# Patient Record
Sex: FEMALE | Race: WHITE | NOT HISPANIC OR LATINO | Employment: FULL TIME | URBAN - METROPOLITAN AREA
[De-identification: names, ages, dates, MRNs, and addresses within clinical notes are randomized per-mention and may not be internally consistent; named-entity substitution may affect disease eponyms.]

---

## 2021-04-30 ENCOUNTER — TELEPHONE (OUTPATIENT)
Dept: VASCULAR SURGERY | Facility: CLINIC | Age: 31
End: 2021-04-30

## 2021-04-30 NOTE — TELEPHONE ENCOUNTER
Referral received from Dr. 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 Reardon for effort thrombosis/venous TOS. Pt contacted to schedule a video visit on 5/4. Will request venogram and CXR from [facility] into our PACs.

## 2021-05-03 DIAGNOSIS — G54.0 THORACIC OUTLET SYNDROME: Primary | ICD-10-CM

## 2021-05-04 NOTE — TELEPHONE ENCOUNTER
DIAGNOSIS: TOS patient referred by Dr. Falcon at [facility]   DATE RECEIVED: 5.6.21   NOTES STATUS DETAILS   OFFICE NOTE from referring provider CE 4.27-4.29.21  Dr. Kel Rg  [facility]   OFFICE NOTE from other specialist CE 4.27.21  Dr. Mehdi Lyles   Urgent Care   OPERATIVE REPORT na    MEDICATION LIST CE    PERTINENT LABS CE    CTA (CT ANGIOGRAPHY) Pacs 4.27.21  CTA Chest   CT na    MRI na    ULTRASOUND Pacs 4.28.21  IR Venography Rt Upper Ext    4.27.21  US Venous Right Upper Ext

## 2021-05-06 ENCOUNTER — VIRTUAL VISIT (OUTPATIENT)
Dept: VASCULAR SURGERY | Facility: CLINIC | Age: 31
End: 2021-05-06
Payer: COMMERCIAL

## 2021-05-06 ENCOUNTER — PRE VISIT (OUTPATIENT)
Dept: VASCULAR SURGERY | Facility: CLINIC | Age: 31
End: 2021-05-06

## 2021-05-06 DIAGNOSIS — I82.890: Primary | ICD-10-CM

## 2021-05-06 DIAGNOSIS — Z11.59 ENCOUNTER FOR SCREENING FOR OTHER VIRAL DISEASES: ICD-10-CM

## 2021-05-06 DIAGNOSIS — I87.1 VENOUS THORACIC OUTLET SYNDROME OF RIGHT SUBCLAVIAN VEIN: ICD-10-CM

## 2021-05-06 PROCEDURE — 99417 PROLNG OP E/M EACH 15 MIN: CPT | Mod: GT | Performed by: SURGERY

## 2021-05-06 PROCEDURE — 99205 OFFICE O/P NEW HI 60 MIN: CPT | Mod: GT | Performed by: SURGERY

## 2021-05-06 RX ORDER — CEFAZOLIN SODIUM 2 G/50ML
2 SOLUTION INTRAVENOUS
Status: CANCELLED | OUTPATIENT
Start: 2021-05-06

## 2021-05-06 RX ORDER — CEFAZOLIN SODIUM 2 G/50ML
2 SOLUTION INTRAVENOUS SEE ADMIN INSTRUCTIONS
Status: CANCELLED | OUTPATIENT
Start: 2021-05-06

## 2021-05-06 RX ORDER — ACETAMINOPHEN 325 MG/1
650 TABLET ORAL EVERY 4 HOURS PRN
COMMUNITY
Start: 2021-04-29

## 2021-05-06 NOTE — LETTER
Date:July 15, 2021      Patient was self referred, no letter generated. Do not send.        [facility] Health Information

## 2021-05-06 NOTE — PROGRESS NOTES
Vascular Surgery Consultation Note     Patient:  Nell Barron   Date of birth 1990, Medical record number [id number]  Date of Visit:  05/06/2021  Consult Requester:No att. 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  I have recommended right transaxillary first rib resection for May 17.  She will hold her anticoagulation prior to surgery without a Lovenox bridge.  If all goes well she may be able to go home later that day otherwise the following day after observation.  We will plan to resume her anticoagulation after surgery.  Tentatively she would remain on this for approximately 1 month at which time I will reevaluate her right upper extremity venous duplex for resolution of her thrombus and also evaluate her left upper extremity for thoracic outlet syndrome.  She understands the need for physical therapy to be initiated approximately 2 to 2-1/2 weeks.  We also discussed that her risks of the procedure including bleeding and injury to nearby structures and potential for hemo or pneumothorax.  I encouraged her to reconnect with me via Prom[employer] if any additional questions arise.  All questions were currently answered.  We will be seeing her in the very near future.  Her admission history and physical to [facility] on April 29 can be utilized for her preoperative H&P.    Many thanks for involving me in the care of this very pleasant patient. Should any questions or concerns arise, please don't hesitate to contact me.    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  She played tennis in high school in her first year of college and still plays today.  She is an avid golfer.  She works out daily with [employer] cycling for roughly 1 hour and will also do arms abs and legs with weight lifting during the week.  On the last Monday of April she was working out her arms and did a slightly different rotational right arm movement then noticed some problems with her shoulder.  She began noticing that her Fitbit was tight on her right arm.  The following morning when she was taking her shower she noticed that her arm was purple.  She sought treatment at a local urgent care who was unable to perform a venous duplex.  Ultimately she went to [facility] for a venous duplex revealing subclavian vein thrombosis on the right consistent with thoracic outlet syndrome.  She underwent thrombolysis.  I was called from cardiac surgery at [facility] to see Nell as a consultation for thoracic outlet syndrome.  She notes she has had longstanding issues with some pain in her right shoulder.  She is also found it unusual over most of her teenage and adult life that it is hard for her to hold her arms out for any length of time as they become very fatigued  There is no history of thoracic outlet in the family.    Nell is right-handed and denies any history of specific trauma though she did have a clavicle fracture reportedly at birth.  She has a history of pseudotumor cerebri and was hospitalized at the [facility] at a young age.  She underwent left eye surgery and lumbar taps given some issues with her vision.  She never had her peripheral  vision restored fully but is able to see straight ahead without issue.    She is engaged in looking forward to her wedding in October of this year.  She is a non-smoker.      Review of Systems   Constitutional, HEENT, cardiovascular, pulmonary, GI, , musculoskeletal, neuro, skin, endocrine and psych systems are negative, except as otherwise noted.    Physical Exam   GENERAL: Healthy, alert and no distress  EYES: Eyes grossly normal to inspection.  No discharge or erythema, or obvious scleral/conjunctival abnormalities.  RESP: No audible wheeze, cough, or visible cyanosis.  No visible retractions or increased work of breathing.    SKIN: Visible skin clear. No significant rash, abnormal pigmentation or lesions.  NEURO: Cranial nerves grossly intact.  Mentation and speech appropriate for age.  PSYCH: Mentation appears normal, affect normal/bright, judgement and insight intact, normal speech and appearance well-groomed.    Right upper extremity venography shows a classic stenotic area in the proximal right subclavian vein consistent with thoracic outlet syndrome.  Chest x-ray shows no evidence of cervical rib or rib abnormality.      Nell is a 31 year old who is being evaluated via a billable video visit.      How would you like to obtain your AVS? Melophone  Patient is connecting via Melophone.  Will anyone else be joining your video visit? No    Video Start Time: 2:16 PM    Video-Visit Details    Type of service:  Video Visit    Video End Time:3:20 PM    Originating Location (pt. Location): Home    Distant Location (provider location):  [facility] VASCULAR CLINIC [place]     Platform used for Video Visit: [employer]

## 2021-05-06 NOTE — LETTER
5/6/2021       RE: Nell Barron  [address] [address]     Dear Colleague,    Thank you for referring your patient, Nell Barron, to the [facility] [place] at [facility]. Please see a copy of my visit note below.      Vascular Surgery Consultation Note     Patient:  Nell Barron   Date of birth 1990, Medical record number [id number]  Date of Visit:  05/06/2021  Consult Requester:No att. 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  I have recommended right transaxillary first rib resection for May 17.  She will hold her anticoagulation prior to surgery without a Lovenox bridge.  If all goes well she may be able to go home later that day otherwise the following day after observation.  We will plan to resume her anticoagulation after surgery.  Tentatively she would remain on this for approximately 1 month at which time I will reevaluate her right upper extremity venous duplex for resolution of her thrombus and also evaluate her left upper extremity for thoracic outlet syndrome.  She understands the need for physical therapy to be initiated approximately 2 to 2-1/2 weeks.  We also discussed that her risks of the procedure including bleeding and injury to nearby structures and potential for hemo or pneumothorax.  I encouraged her to reconnect with me via [employer] if any additional questions arise.  All questions were currently answered.  We will be seeing her in the very near future.  Her admission history and physical to [facility] on April 29 can be utilized for her preoperative H&P.    Many thanks for involving me in the care of this very pleasant patient. Should any questions or concerns arise, please don't hesitate to contact me.    Warm Regards,    Cristiana Reardon MD, DFSVS,  LISA  Director, [facility] Vascular Services  Professor and Chief, Vascular and Endovascular Surgery  [facility]  Cell: [phone]  [email or web address]          90 minutes spent on the date of the encounter doing chart review, review of outside records, review of test results, interpretation of tests, patient visit, documentation and discussion with other provider(s)           HPI: Nell is a very pleasant 31-year-old female being seen today for recent diagnosis of effort thrombosis and right venous thoracic outlet syndrome.  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  She played tennis in high school in her first year of college and still plays today.  She is an avid golfer.  She works out daily with [employer] cycling for roughly 1 hour and will also do arms abs and legs with weight lifting during the week.  On the last Monday of April she was working out her arms and did a slightly different rotational right arm movement then noticed some problems with her shoulder.  She began noticing that her Fitbit was tight on her right arm.  The following morning when she was taking her shower she noticed that her arm was purple.  She sought treatment at a local urgent care who was unable to perform a venous duplex.  Ultimately she went to [facility] for a venous duplex revealing subclavian vein thrombosis on the right consistent with thoracic outlet syndrome.  She underwent thrombolysis.  I was called from cardiac surgery at [facility] to see Nell as a consultation for thoracic outlet syndrome.  She notes she has had longstanding issues with some pain in her right shoulder.  She is also found it unusual over most of her teenage and adult life that it is hard for her to hold her arms out for any length of time as they become very fatigued  There is no history of thoracic outlet in the family.    Nell is right-handed and denies any  history of specific trauma though she did have a clavicle fracture reportedly at birth.  She has a history of pseudotumor cerebri and was hospitalized at the [facility] at a young age.  She underwent left eye surgery and lumbar taps given some issues with her vision.  She never had her peripheral vision restored fully but is able to see straight ahead without issue.    She is engaged in looking forward to her wedding in October of this year.  She is a non-smoker.      Review of Systems   Constitutional, HEENT, cardiovascular, pulmonary, GI, , musculoskeletal, neuro, skin, endocrine and psych systems are negative, except as otherwise noted.    Physical Exam   GENERAL: Healthy, alert and no distress  EYES: Eyes grossly normal to inspection.  No discharge or erythema, or obvious scleral/conjunctival abnormalities.  RESP: No audible wheeze, cough, or visible cyanosis.  No visible retractions or increased work of breathing.    SKIN: Visible skin clear. No significant rash, abnormal pigmentation or lesions.  NEURO: Cranial nerves grossly intact.  Mentation and speech appropriate for age.  PSYCH: Mentation appears normal, affect normal/bright, judgement and insight intact, normal speech and appearance well-groomed.    Right upper extremity venography shows a classic stenotic area in the proximal right subclavian vein consistent with thoracic outlet syndrome.  Chest x-ray shows no evidence of cervical rib or rib abnormality.      Nell is a 31 year old who is being evaluated via a billable video visit.      How would you like to obtain your AVS? [employer]  Patient is connecting via [employer].  Will anyone else be joining your video visit? No    Video Start Time: 2:16 PM    Video-Visit Details    Type of service:  Video Visit    Video End Time:3:20 PM    Originating Location (pt. Location): Home    Distant Location (provider location):  [facility] VASCULAR CLINIC [place]     Platform used for Video Visit:  [employer]        Again, thank you for allowing me to participate in the care of your patient.      Sincerely,    Cristiana Reardon MD

## 2021-05-06 NOTE — LETTER
5/6/2021      RE: Nell Barron  [address] [address]         Vascular Surgery Consultation Note     Patient:  Nell Barron   Date of birth 1990, Medical record number [id number]  Date of Visit:  05/06/2021  Consult Requester:No att. 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  I have recommended right transaxillary first rib resection for May 17.  She will hold her anticoagulation prior to surgery without a Lovenox bridge.  If all goes well she may be able to go home later that day otherwise the following day after observation.  We will plan to resume her anticoagulation after surgery.  Tentatively she would remain on this for approximately 1 month at which time I will reevaluate her right upper extremity venous duplex for resolution of her thrombus and also evaluate her left upper extremity for thoracic outlet syndrome.  She understands the need for physical therapy to be initiated approximately 2 to 2-1/2 weeks.  We also discussed that her risks of the procedure including bleeding and injury to nearby structures and potential for hemo or pneumothorax.  I encouraged her to reconnect with me via [employer] if any additional questions arise.  All questions were currently answered.  We will be seeing her in the very near future.  Her admission history and physical to [facility] on April 29 can be utilized for her preoperative H&P.    Many thanks for involving me in the care of this very pleasant patient. Should any questions or concerns arise, please don't hesitate to contact me.    Warm Regards,    Cristiana Reardon MD, DFSVS, RPVI  Director, [facility] Vascular Services  Professor and Chief, Vascular and Endovascular Surgery  [facility]  Cell: [phone]  [email or web address]    90 minutes spent on the date of the encounter doing  chart review, review of outside records, review of test results, interpretation of tests, patient visit, documentation and discussion with other provider(s)       HPI: Nell is a very pleasant 31-year-old female being seen today for recent diagnosis of effort thrombosis and right venous thoracic outlet syndrome.  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  She played tennis in high school in her first year of college and still plays today.  She is an avid golfer.  She works out daily with [employer] cycling for roughly 1 hour and will also do arms abs and legs with weight lifting during the week.  On the last Monday of April she was working out her arms and did a slightly different rotational right arm movement then noticed some problems with her shoulder.  She began noticing that her Fitbit was tight on her right arm.  The following morning when she was taking her shower she noticed that her arm was purple.  She sought treatment at a local urgent care who was unable to perform a venous duplex.  Ultimately she went to [facility] for a venous duplex revealing subclavian vein thrombosis on the right consistent with thoracic outlet syndrome.  She underwent thrombolysis.  I was called from cardiac surgery at [facility] to see Nell as a consultation for thoracic outlet syndrome.  She notes she has had longstanding issues with some pain in her right shoulder.  She is also found it unusual over most of her teenage and adult life that it is hard for her to hold her arms out for any length of time as they become very fatigued  There is no history of thoracic outlet in the family.    Nell is right-handed and denies any history of specific trauma though she did have a clavicle fracture reportedly at birth.  She has a history of pseudotumor cerebri and was hospitalized at the [facility] at a young age.  She underwent left eye surgery and  lumbar taps given some issues with her vision.  She never had her peripheral vision restored fully but is able to see straight ahead without issue.    She is engaged in looking forward to her wedding in October of this year.  She is a non-smoker.    Review of Systems   Constitutional, HEENT, cardiovascular, pulmonary, GI, , musculoskeletal, neuro, skin, endocrine and psych systems are negative, except as otherwise noted.    Physical Exam   GENERAL: Healthy, alert and no distress  EYES: Eyes grossly normal to inspection.  No discharge or erythema, or obvious scleral/conjunctival abnormalities.  RESP: No audible wheeze, cough, or visible cyanosis.  No visible retractions or increased work of breathing.    SKIN: Visible skin clear. No significant rash, abnormal pigmentation or lesions.  NEURO: Cranial nerves grossly intact.  Mentation and speech appropriate for age.  PSYCH: Mentation appears normal, affect normal/bright, judgement and insight intact, normal speech and appearance well-groomed.    Right upper extremity venography shows a classic stenotic area in the proximal right subclavian vein consistent with thoracic outlet syndrome.  Chest x-ray shows no evidence of cervical rib or rib abnormality.      Nell is a 31 year old who is being evaluated via a billable video visit.      How would you like to obtain your AVS? [employer]  Patient is connecting via [employer].  Will anyone else be joining your video visit? No      Video-Visit Details    Type of service:  Video Visit    Video Start Time: 2:16 PM    Video End Time:3:20 PM    Originating Location (pt. Location): Home    Distant Location (provider location):  [facility] VASCULAR CLINIC [place]     Platform used for Video Visit: Uche Reardon MD

## 2021-05-07 ENCOUNTER — TELEPHONE (OUTPATIENT)
Dept: VASCULAR SURGERY | Facility: CLINIC | Age: 31
End: 2021-05-07

## 2021-05-07 ENCOUNTER — AMBULATORY - HEALTHEAST (OUTPATIENT)
Dept: FAMILY MEDICINE | Facility: CLINIC | Age: 31
End: 2021-05-07

## 2021-05-07 DIAGNOSIS — I87.1 VENOUS THORACIC OUTLET SYNDROME OF RIGHT SUBCLAVIAN VEIN: Primary | ICD-10-CM

## 2021-05-07 DIAGNOSIS — G54.0 THORACIC OUTLET SYNDROME: Primary | ICD-10-CM

## 2021-05-07 DIAGNOSIS — I82.890: ICD-10-CM

## 2021-05-07 DIAGNOSIS — Z11.59 ENCOUNTER FOR SCREENING FOR OTHER VIRAL DISEASES: ICD-10-CM

## 2021-05-07 NOTE — TELEPHONE ENCOUNTER
Spoke with patient to schedule procedure with Dr. Reardon   Procedure was scheduled on Monday 05/17/21 at [facility] OR  Patient will have H&P with: per Dr. Reardon patient had H&P at [facility] on 04/29/2021    Patient is aware a COVID-19 test is needed before their procedure. The test should be with-in 4 days of their procedure.   Test Details:Patient wants to schedule at [facility]. Order is in the system and gave patient number to call to schedule/    PO Visit scheduled on:     2 week Monday 06/07 @ 2:15pm in person with Adelita Ferreira.  4 week: video visit with Dr. Reardon Wednesday 06/16/21    Patient is aware a / is needed day of surgery.   Surgery letter was sent via [employer] and mailed 05/07/21, patient has my direct contact information for any further questions.     *Will fax physical therapy orders per patient request to the number she provided to [facility] [place] fax # [phone].

## 2021-05-14 ENCOUNTER — ANESTHESIA EVENT (OUTPATIENT)
Dept: SURGERY | Facility: CLINIC | Age: 31
End: 2021-05-14
Payer: COMMERCIAL

## 2021-05-14 RX ORDER — MULTIPLE VITAMINS W/ MINERALS TAB 9MG-400MCG
1 TAB ORAL DAILY
COMMUNITY

## 2021-05-14 NOTE — ANESTHESIA PREPROCEDURE EVALUATION
"Anesthesia Pre-Procedure Evaluation    Patient: Nell Barron   MRN: [id number] : 1990        Preoperative Diagnosis: Venous thoracic outlet syndrome of right subclavian vein [I87.1]   Procedure : Procedure(s):  Right transaxillary first rib resection     Past Medical History:   Diagnosis Date     DVT (deep vein thrombosis) in pregnancy     right axillosubclavian     Pseudotumor cerebri      Thoracic outlet syndrome       Past Surgical History:   Procedure Laterality Date     pseudotumor      cerebri age 16      Allergies   Allergen Reactions     Minocycline Other (See Comments)     Happened as a child, cerebral spinal fluid \"build-up\"        Social History     Tobacco Use     Smoking status: Not on file   Substance Use Topics     Alcohol use: Not on file      Wt Readings from Last 1 Encounters:   No data found for Wt        Anesthesia Evaluation            ROS/MED HX  ENT/Pulmonary:  - neg pulmonary ROS     Neurologic: Comment: H/o pseudotumor cerebri        Cardiovascular:     (+) -----Taking blood thinners (eliquis) Previous cardiac testing   Echo: Date:  Results:  Left ventricular ejection fraction is visually estimated at 65%.  No significant valvular abnormalities were identified.  No previous study available for comparison.  Stress Test: Date: Results:    ECG Reviewed: Date: Results:    Cath: Date: Results:      METS/Exercise Tolerance:     Hematologic: Comments: Subclavian vein thrombosis on the right consistent with thoracic outlet syndrome    (+) History of blood clots,     Musculoskeletal:  - neg musculoskeletal ROS     GI/Hepatic:  - neg GI/hepatic ROS     Renal/Genitourinary:  - neg Renal ROS     Endo:  - neg endo ROS     Psychiatric/Substance Use:  - neg psychiatric ROS     Infectious Disease:  - neg infectious disease ROS     Malignancy:  - neg malignancy ROS     Other:  - neg other ROS             OUTSIDE LABS:  CBC:   Lab Results   Component Value Date    WBC 5.8 2006    HGB 13.7 " 01/13/2006    HCT 40.0 01/13/2006     01/13/2006     BMP:   Lab Results   Component Value Date     01/18/2006     01/17/2006    POTASSIUM 3.5 01/18/2006    POTASSIUM 3.9 01/17/2006    CHLORIDE 112 (H) 01/18/2006    CHLORIDE 114 (H) 01/17/2006    CO2 15 (L) 01/18/2006    CO2 14 (LL) 01/17/2006    BUN 16 01/18/2006    BUN 20 01/17/2006    CR 0.87 01/18/2006    CR 0.93 01/17/2006     (H) 01/18/2006     (H) 01/17/2006     COAGS: No results found for: PTT, INR, FIBR  POC: No results found for: BGM, HCG, HCGS  HEPATIC:   Lab Results   Component Value Date    ALBUMIN 4.6 01/13/2006    PROTTOTAL 8.0 01/13/2006    ALT 33 01/13/2006    AST 51 (H) 01/13/2006    ALKPHOS Unsatisfactory specimen - hemolyzed 01/13/2006    BILITOTAL 0.7 01/13/2006     OTHER:   Lab Results   Component Value Date    PEDRO 8.8 01/18/2006    PHOS 4.2 01/15/2006    MAG 2.3 01/15/2006    TSH 0.65 01/14/2006    CRP <0.2 01/13/2006    SED 7 01/13/2006       Anesthesia Plan    ASA Status:  2   NPO Status:  NPO Appropriate    Anesthesia Type: General.     - Airway: ETT   Induction: Intravenous, Propofol.   Maintenance: Balanced.   Techniques and Equipment:     - Lines/Monitors: 2nd IV     Consents    Anesthesia Plan(s) and associated risks, benefits, and realistic alternatives discussed. Questions answered and patient/representative(s) expressed understanding.     - Discussed with:  Patient      - Extended Intubation/Ventilatory Support Discussed: No.      - Patient is DNR/DNI Status: No    Use of blood products discussed: No .     Postoperative Care    Pain management: IV analgesics, Oral pain medications, Peripheral nerve block (Single Shot).   PONV prophylaxis: Ondansetron (or other 5HT-3), Dexamethasone or Solumedrol     Comments:    single shot Erector Spinae injection at emergence            Matt Robert DO

## 2021-05-15 ENCOUNTER — AMBULATORY - HEALTHEAST (OUTPATIENT)
Dept: FAMILY MEDICINE | Facility: CLINIC | Age: 31
End: 2021-05-15

## 2021-05-15 DIAGNOSIS — Z11.59 ENCOUNTER FOR SCREENING FOR OTHER VIRAL DISEASES: ICD-10-CM

## 2021-05-15 LAB
SARS-COV-2 PCR COMMENT: NORMAL
SARS-COV-2 RNA SPEC QL NAA+PROBE: NEGATIVE
SARS-COV-2 VIRUS SPECIMEN SOURCE: NORMAL

## 2021-05-16 ENCOUNTER — COMMUNICATION - HEALTHEAST (OUTPATIENT)
Dept: SCHEDULING | Facility: CLINIC | Age: 31
End: 2021-05-16

## 2021-05-17 ENCOUNTER — HOSPITAL ENCOUNTER (OUTPATIENT)
Facility: CLINIC | Age: 31
Setting detail: OBSERVATION
LOS: 1 days | Discharge: HOME | End: 2021-05-18
Attending: SURGERY | Admitting: SURGERY
Payer: COMMERCIAL

## 2021-05-17 ENCOUNTER — ANESTHESIA (OUTPATIENT)
Dept: SURGERY | Facility: CLINIC | Age: 31
End: 2021-05-17
Payer: COMMERCIAL

## 2021-05-17 ENCOUNTER — APPOINTMENT (OUTPATIENT)
Dept: GENERAL RADIOLOGY | Facility: CLINIC | Age: 31
End: 2021-05-17
Attending: SURGERY
Payer: COMMERCIAL

## 2021-05-17 ENCOUNTER — APPOINTMENT (OUTPATIENT)
Dept: ULTRASOUND IMAGING | Facility: CLINIC | Age: 31
End: 2021-05-17
Attending: SURGERY
Payer: COMMERCIAL

## 2021-05-17 ENCOUNTER — TELEPHONE (OUTPATIENT)
Dept: VASCULAR SURGERY | Facility: CLINIC | Age: 31
End: 2021-05-17

## 2021-05-17 DIAGNOSIS — I87.1 VENOUS THORACIC OUTLET SYNDROME OF RIGHT SUBCLAVIAN VEIN: ICD-10-CM

## 2021-05-17 DIAGNOSIS — R11.2 PONV (POSTOPERATIVE NAUSEA AND VOMITING): Primary | ICD-10-CM

## 2021-05-17 DIAGNOSIS — Z98.890 PONV (POSTOPERATIVE NAUSEA AND VOMITING): Primary | ICD-10-CM

## 2021-05-17 LAB
ABO + RH BLD: NORMAL
ABO + RH BLD: NORMAL
BLD GP AB SCN SERPL QL: NORMAL
BLOOD BANK CMNT PATIENT-IMP: NORMAL
GLUCOSE BLDC GLUCOMTR-MCNC: 85 MG/DL (ref 70–99)
HCG UR QL: NEGATIVE
SPECIMEN EXP DATE BLD: NORMAL

## 2021-05-17 PROCEDURE — 258N000003 HC RX IP 258 OP 636: Performed by: STUDENT IN AN ORGANIZED HEALTH CARE EDUCATION/TRAINING PROGRAM

## 2021-05-17 PROCEDURE — 999N000065 XR CHEST PORT 1 VIEW

## 2021-05-17 PROCEDURE — G0378 HOSPITAL OBSERVATION PER HR: HCPCS

## 2021-05-17 PROCEDURE — 250N000011 HC RX IP 250 OP 636

## 2021-05-17 PROCEDURE — 96374 THER/PROPH/DIAG INJ IV PUSH: CPT

## 2021-05-17 PROCEDURE — 250N000013 HC RX MED GY IP 250 OP 250 PS 637: Performed by: STUDENT IN AN ORGANIZED HEALTH CARE EDUCATION/TRAINING PROGRAM

## 2021-05-17 PROCEDURE — 250N000011 HC RX IP 250 OP 636: Performed by: ANESTHESIOLOGY

## 2021-05-17 PROCEDURE — 250N000009 HC RX 250: Performed by: ANESTHESIOLOGY

## 2021-05-17 PROCEDURE — 258N000003 HC RX IP 258 OP 636: Performed by: ANESTHESIOLOGY

## 2021-05-17 PROCEDURE — 250N000011 HC RX IP 250 OP 636: Performed by: STUDENT IN AN ORGANIZED HEALTH CARE EDUCATION/TRAINING PROGRAM

## 2021-05-17 PROCEDURE — 999N000141 HC STATISTIC PRE-PROCEDURE NURSING ASSESSMENT: Performed by: SURGERY

## 2021-05-17 PROCEDURE — 250N000009 HC RX 250: Performed by: STUDENT IN AN ORGANIZED HEALTH CARE EDUCATION/TRAINING PROGRAM

## 2021-05-17 PROCEDURE — 93971 EXTREMITY STUDY: CPT | Mod: 26 | Performed by: RADIOLOGY

## 2021-05-17 PROCEDURE — 250N000011 HC RX IP 250 OP 636: Performed by: SURGERY

## 2021-05-17 PROCEDURE — 86900 BLOOD TYPING SEROLOGIC ABO: CPT | Performed by: STUDENT IN AN ORGANIZED HEALTH CARE EDUCATION/TRAINING PROGRAM

## 2021-05-17 PROCEDURE — 250N000009 HC RX 250: Performed by: SURGERY

## 2021-05-17 PROCEDURE — 81025 URINE PREGNANCY TEST: CPT | Performed by: STUDENT IN AN ORGANIZED HEALTH CARE EDUCATION/TRAINING PROGRAM

## 2021-05-17 PROCEDURE — 999N001017 HC STATISTIC GLUCOSE BY METER IP

## 2021-05-17 PROCEDURE — 710N000010 HC RECOVERY PHASE 1, LEVEL 2, PER MIN: Performed by: SURGERY

## 2021-05-17 PROCEDURE — 86850 RBC ANTIBODY SCREEN: CPT | Performed by: STUDENT IN AN ORGANIZED HEALTH CARE EDUCATION/TRAINING PROGRAM

## 2021-05-17 PROCEDURE — 370N000017 HC ANESTHESIA TECHNICAL FEE, PER MIN: Performed by: SURGERY

## 2021-05-17 PROCEDURE — 86901 BLOOD TYPING SEROLOGIC RH(D): CPT | Performed by: STUDENT IN AN ORGANIZED HEALTH CARE EDUCATION/TRAINING PROGRAM

## 2021-05-17 PROCEDURE — 96376 TX/PRO/DX INJ SAME DRUG ADON: CPT

## 2021-05-17 PROCEDURE — 96375 TX/PRO/DX INJ NEW DRUG ADDON: CPT

## 2021-05-17 PROCEDURE — 93971 EXTREMITY STUDY: CPT | Mod: RT

## 2021-05-17 PROCEDURE — 71045 X-RAY EXAM CHEST 1 VIEW: CPT | Mod: 26 | Performed by: RADIOLOGY

## 2021-05-17 PROCEDURE — 250N000025 HC SEVOFLURANE, PER MIN: Performed by: SURGERY

## 2021-05-17 PROCEDURE — 360N000077 HC SURGERY LEVEL 4, PER MIN: Performed by: SURGERY

## 2021-05-17 PROCEDURE — 272N000001 HC OR GENERAL SUPPLY STERILE: Performed by: SURGERY

## 2021-05-17 RX ORDER — ACETAMINOPHEN 325 MG/1
650 TABLET ORAL EVERY 6 HOURS
Status: DISCONTINUED | OUTPATIENT
Start: 2021-05-17 | End: 2021-05-18 | Stop reason: HOSPADM

## 2021-05-17 RX ORDER — SODIUM CHLORIDE, SODIUM LACTATE, POTASSIUM CHLORIDE, CALCIUM CHLORIDE 600; 310; 30; 20 MG/100ML; MG/100ML; MG/100ML; MG/100ML
INJECTION, SOLUTION INTRAVENOUS CONTINUOUS
Status: DISCONTINUED | OUTPATIENT
Start: 2021-05-17 | End: 2021-05-17 | Stop reason: HOSPADM

## 2021-05-17 RX ORDER — NALOXONE HYDROCHLORIDE 0.4 MG/ML
0.2 INJECTION, SOLUTION INTRAMUSCULAR; INTRAVENOUS; SUBCUTANEOUS
Status: DISCONTINUED | OUTPATIENT
Start: 2021-05-17 | End: 2021-05-18 | Stop reason: HOSPADM

## 2021-05-17 RX ORDER — LIDOCAINE 40 MG/G
CREAM TOPICAL
Status: DISCONTINUED | OUTPATIENT
Start: 2021-05-17 | End: 2021-05-17 | Stop reason: HOSPADM

## 2021-05-17 RX ORDER — FENTANYL CITRATE 50 UG/ML
25-50 INJECTION, SOLUTION INTRAMUSCULAR; INTRAVENOUS
Status: DISCONTINUED | OUTPATIENT
Start: 2021-05-17 | End: 2021-05-17 | Stop reason: HOSPADM

## 2021-05-17 RX ORDER — NALOXONE HYDROCHLORIDE 0.4 MG/ML
0.2 INJECTION, SOLUTION INTRAMUSCULAR; INTRAVENOUS; SUBCUTANEOUS
Status: DISCONTINUED | OUTPATIENT
Start: 2021-05-17 | End: 2021-05-17 | Stop reason: HOSPADM

## 2021-05-17 RX ORDER — KETOROLAC TROMETHAMINE 30 MG/ML
15 INJECTION, SOLUTION INTRAMUSCULAR; INTRAVENOUS EVERY 6 HOURS
Status: DISCONTINUED | OUTPATIENT
Start: 2021-05-17 | End: 2021-05-17

## 2021-05-17 RX ORDER — ONDANSETRON 2 MG/ML
8 INJECTION INTRAMUSCULAR; INTRAVENOUS EVERY 6 HOURS PRN
Status: DISCONTINUED | OUTPATIENT
Start: 2021-05-17 | End: 2021-05-18 | Stop reason: HOSPADM

## 2021-05-17 RX ORDER — NALOXONE HYDROCHLORIDE 0.4 MG/ML
0.4 INJECTION, SOLUTION INTRAMUSCULAR; INTRAVENOUS; SUBCUTANEOUS
Status: DISCONTINUED | OUTPATIENT
Start: 2021-05-17 | End: 2021-05-18 | Stop reason: HOSPADM

## 2021-05-17 RX ORDER — FLUMAZENIL 0.1 MG/ML
0.2 INJECTION, SOLUTION INTRAVENOUS
Status: DISCONTINUED | OUTPATIENT
Start: 2021-05-17 | End: 2021-05-17 | Stop reason: HOSPADM

## 2021-05-17 RX ORDER — GLYCOPYRROLATE 0.2 MG/ML
INJECTION, SOLUTION INTRAMUSCULAR; INTRAVENOUS PRN
Status: DISCONTINUED | OUTPATIENT
Start: 2021-05-17 | End: 2021-05-17

## 2021-05-17 RX ORDER — SODIUM CHLORIDE, SODIUM LACTATE, POTASSIUM CHLORIDE, CALCIUM CHLORIDE 600; 310; 30; 20 MG/100ML; MG/100ML; MG/100ML; MG/100ML
INJECTION, SOLUTION INTRAVENOUS CONTINUOUS PRN
Status: DISCONTINUED | OUTPATIENT
Start: 2021-05-17 | End: 2021-05-17

## 2021-05-17 RX ORDER — HYDROMORPHONE HYDROCHLORIDE 2 MG/1
2 TABLET ORAL EVERY 6 HOURS PRN
Qty: 10 TABLET | Refills: 0 | Status: SHIPPED | OUTPATIENT
Start: 2021-05-17 | End: 2021-05-20

## 2021-05-17 RX ORDER — DEXAMETHASONE SODIUM PHOSPHATE 4 MG/ML
INJECTION, SOLUTION INTRA-ARTICULAR; INTRALESIONAL; INTRAMUSCULAR; INTRAVENOUS; SOFT TISSUE PRN
Status: DISCONTINUED | OUTPATIENT
Start: 2021-05-17 | End: 2021-05-17

## 2021-05-17 RX ORDER — HYDROMORPHONE HYDROCHLORIDE 2 MG/1
2-4 TABLET ORAL
Status: DISCONTINUED | OUTPATIENT
Start: 2021-05-17 | End: 2021-05-18 | Stop reason: HOSPADM

## 2021-05-17 RX ORDER — PROPOFOL 10 MG/ML
INJECTION, EMULSION INTRAVENOUS PRN
Status: DISCONTINUED | OUTPATIENT
Start: 2021-05-17 | End: 2021-05-17

## 2021-05-17 RX ORDER — EPHEDRINE SULFATE 50 MG/ML
INJECTION, SOLUTION INTRAMUSCULAR; INTRAVENOUS; SUBCUTANEOUS PRN
Status: DISCONTINUED | OUTPATIENT
Start: 2021-05-17 | End: 2021-05-17

## 2021-05-17 RX ORDER — AMOXICILLIN 250 MG
1 CAPSULE ORAL 2 TIMES DAILY PRN
Qty: 30 TABLET | Refills: 0 | Status: SHIPPED | OUTPATIENT
Start: 2021-05-17

## 2021-05-17 RX ORDER — DEXAMETHASONE SODIUM PHOSPHATE 10 MG/ML
INJECTION, SOLUTION INTRAMUSCULAR; INTRAVENOUS PRN
Status: DISCONTINUED | OUTPATIENT
Start: 2021-05-17 | End: 2021-05-17

## 2021-05-17 RX ORDER — CEFAZOLIN SODIUM 1 G/3ML
INJECTION, POWDER, FOR SOLUTION INTRAMUSCULAR; INTRAVENOUS PRN
Status: DISCONTINUED | OUTPATIENT
Start: 2021-05-17 | End: 2021-05-17

## 2021-05-17 RX ORDER — IBUPROFEN 600 MG/1
600 TABLET, FILM COATED ORAL EVERY 6 HOURS
Qty: 90 TABLET | Refills: 1 | Status: SHIPPED | OUTPATIENT
Start: 2021-05-17

## 2021-05-17 RX ORDER — LIDOCAINE HYDROCHLORIDE 20 MG/ML
INJECTION, SOLUTION INFILTRATION; PERINEURAL PRN
Status: DISCONTINUED | OUTPATIENT
Start: 2021-05-17 | End: 2021-05-17

## 2021-05-17 RX ORDER — ACETAMINOPHEN 325 MG/1
975 TABLET ORAL ONCE
Status: COMPLETED | OUTPATIENT
Start: 2021-05-17 | End: 2021-05-17

## 2021-05-17 RX ORDER — LIDOCAINE 40 MG/G
CREAM TOPICAL
Status: DISCONTINUED | OUTPATIENT
Start: 2021-05-17 | End: 2021-05-18 | Stop reason: HOSPADM

## 2021-05-17 RX ORDER — BUPIVACAINE HYDROCHLORIDE AND EPINEPHRINE 2.5; 5 MG/ML; UG/ML
INJECTION, SOLUTION EPIDURAL; INFILTRATION; INTRACAUDAL; PERINEURAL PRN
Status: DISCONTINUED | OUTPATIENT
Start: 2021-05-17 | End: 2021-05-17 | Stop reason: HOSPADM

## 2021-05-17 RX ORDER — BUPIVACAINE HYDROCHLORIDE 2.5 MG/ML
INJECTION, SOLUTION EPIDURAL; INFILTRATION; INTRACAUDAL PRN
Status: DISCONTINUED | OUTPATIENT
Start: 2021-05-17 | End: 2021-05-17

## 2021-05-17 RX ORDER — REMIFENTANIL HYDROCHLORIDE 1 MG/ML
INJECTION, POWDER, LYOPHILIZED, FOR SOLUTION INTRAVENOUS PRN
Status: DISCONTINUED | OUTPATIENT
Start: 2021-05-17 | End: 2021-05-17

## 2021-05-17 RX ORDER — KETOROLAC TROMETHAMINE 30 MG/ML
30 INJECTION, SOLUTION INTRAMUSCULAR; INTRAVENOUS EVERY 6 HOURS
Status: DISCONTINUED | OUTPATIENT
Start: 2021-05-17 | End: 2021-05-18 | Stop reason: HOSPADM

## 2021-05-17 RX ORDER — NALOXONE HYDROCHLORIDE 0.4 MG/ML
0.4 INJECTION, SOLUTION INTRAMUSCULAR; INTRAVENOUS; SUBCUTANEOUS
Status: DISCONTINUED | OUTPATIENT
Start: 2021-05-17 | End: 2021-05-17 | Stop reason: HOSPADM

## 2021-05-17 RX ORDER — CEFAZOLIN SODIUM 2 G/100ML
2 INJECTION, SOLUTION INTRAVENOUS SEE ADMIN INSTRUCTIONS
Status: DISCONTINUED | OUTPATIENT
Start: 2021-05-17 | End: 2021-05-17 | Stop reason: HOSPADM

## 2021-05-17 RX ORDER — CEFAZOLIN SODIUM 2 G/100ML
2 INJECTION, SOLUTION INTRAVENOUS
Status: DISCONTINUED | OUTPATIENT
Start: 2021-05-17 | End: 2021-05-17 | Stop reason: HOSPADM

## 2021-05-17 RX ORDER — HYDROMORPHONE HYDROCHLORIDE 1 MG/ML
.3-.5 INJECTION, SOLUTION INTRAMUSCULAR; INTRAVENOUS; SUBCUTANEOUS EVERY 5 MIN PRN
Status: DISCONTINUED | OUTPATIENT
Start: 2021-05-17 | End: 2021-05-17 | Stop reason: HOSPADM

## 2021-05-17 RX ORDER — KETOROLAC TROMETHAMINE 30 MG/ML
INJECTION, SOLUTION INTRAMUSCULAR; INTRAVENOUS PRN
Status: DISCONTINUED | OUTPATIENT
Start: 2021-05-17 | End: 2021-05-17

## 2021-05-17 RX ORDER — ONDANSETRON 4 MG/1
4 TABLET, ORALLY DISINTEGRATING ORAL EVERY 30 MIN PRN
Status: DISCONTINUED | OUTPATIENT
Start: 2021-05-17 | End: 2021-05-17 | Stop reason: HOSPADM

## 2021-05-17 RX ORDER — ONDANSETRON 2 MG/ML
4 INJECTION INTRAMUSCULAR; INTRAVENOUS EVERY 30 MIN PRN
Status: DISCONTINUED | OUTPATIENT
Start: 2021-05-17 | End: 2021-05-17 | Stop reason: HOSPADM

## 2021-05-17 RX ADMIN — KETOROLAC TROMETHAMINE 30 MG: 30 INJECTION, SOLUTION INTRAMUSCULAR at 10:05

## 2021-05-17 RX ADMIN — Medication 2 G: at 08:02

## 2021-05-17 RX ADMIN — Medication 5 MG: at 09:10

## 2021-05-17 RX ADMIN — HYDROMORPHONE HYDROCHLORIDE 2 MG: 2 TABLET ORAL at 15:25

## 2021-05-17 RX ADMIN — GLYCOPYRROLATE 0.2 MG: 0.2 INJECTION, SOLUTION INTRAMUSCULAR; INTRAVENOUS at 07:40

## 2021-05-17 RX ADMIN — FENTANYL CITRATE 25 MCG: 50 INJECTION, SOLUTION INTRAMUSCULAR; INTRAVENOUS at 11:33

## 2021-05-17 RX ADMIN — CEFAZOLIN 1 G: 1 INJECTION, POWDER, FOR SOLUTION INTRAMUSCULAR; INTRAVENOUS at 10:14

## 2021-05-17 RX ADMIN — MIDAZOLAM 2 MG: 1 INJECTION INTRAMUSCULAR; INTRAVENOUS at 07:30

## 2021-05-17 RX ADMIN — KETOROLAC TROMETHAMINE 30 MG: 30 INJECTION, SOLUTION INTRAMUSCULAR; INTRAVENOUS at 22:54

## 2021-05-17 RX ADMIN — ACETAMINOPHEN 975 MG: 325 TABLET, FILM COATED ORAL at 07:04

## 2021-05-17 RX ADMIN — DEXAMETHASONE SODIUM PHOSPHATE 2 MG: 10 INJECTION, SOLUTION INTRAMUSCULAR; INTRAVENOUS at 10:39

## 2021-05-17 RX ADMIN — FENTANYL CITRATE 25 MCG: 50 INJECTION, SOLUTION INTRAMUSCULAR; INTRAVENOUS at 11:13

## 2021-05-17 RX ADMIN — HYDROMORPHONE HYDROCHLORIDE 0.5 MG: 1 INJECTION, SOLUTION INTRAMUSCULAR; INTRAVENOUS; SUBCUTANEOUS at 11:59

## 2021-05-17 RX ADMIN — SODIUM CHLORIDE, POTASSIUM CHLORIDE, SODIUM LACTATE AND CALCIUM CHLORIDE: 600; 310; 30; 20 INJECTION, SOLUTION INTRAVENOUS at 07:40

## 2021-05-17 RX ADMIN — BUPIVACAINE HYDROCHLORIDE 20 ML: 2.5 INJECTION, SOLUTION EPIDURAL; INFILTRATION; INTRACAUDAL; PERINEURAL at 10:39

## 2021-05-17 RX ADMIN — DEXAMETHASONE SODIUM PHOSPHATE 8 MG: 4 INJECTION, SOLUTION INTRA-ARTICULAR; INTRALESIONAL; INTRAMUSCULAR; INTRAVENOUS; SOFT TISSUE at 07:41

## 2021-05-17 RX ADMIN — ACETAMINOPHEN 650 MG: 325 TABLET, FILM COATED ORAL at 13:51

## 2021-05-17 RX ADMIN — HYDROMORPHONE HYDROCHLORIDE 0.5 MG: 1 INJECTION, SOLUTION INTRAMUSCULAR; INTRAVENOUS; SUBCUTANEOUS at 12:18

## 2021-05-17 RX ADMIN — REMIFENTANIL HYDROCHLORIDE 100 MCG: 1 INJECTION, POWDER, LYOPHILIZED, FOR SOLUTION INTRAVENOUS at 07:40

## 2021-05-17 RX ADMIN — HYDROMORPHONE HYDROCHLORIDE 4 MG: 2 TABLET ORAL at 23:27

## 2021-05-17 RX ADMIN — REMIFENTANIL HYDROCHLORIDE 0.1 MCG/KG/MIN: 1 INJECTION, POWDER, LYOPHILIZED, FOR SOLUTION INTRAVENOUS at 07:40

## 2021-05-17 RX ADMIN — ACETAMINOPHEN 650 MG: 325 TABLET, FILM COATED ORAL at 21:13

## 2021-05-17 RX ADMIN — HYDROMORPHONE HYDROCHLORIDE 4 MG: 2 TABLET ORAL at 19:06

## 2021-05-17 RX ADMIN — ONDANSETRON 8 MG: 2 INJECTION INTRAMUSCULAR; INTRAVENOUS at 22:47

## 2021-05-17 RX ADMIN — KETOROLAC TROMETHAMINE 30 MG: 30 INJECTION, SOLUTION INTRAMUSCULAR; INTRAVENOUS at 16:41

## 2021-05-17 RX ADMIN — ONDANSETRON 4 MG: 2 INJECTION INTRAMUSCULAR; INTRAVENOUS at 11:26

## 2021-05-17 RX ADMIN — DEXMEDETOMIDINE HYDROCHLORIDE 40 MCG: 100 INJECTION, SOLUTION INTRAVENOUS at 10:39

## 2021-05-17 RX ADMIN — PROPOFOL 150 MG: 10 INJECTION, EMULSION INTRAVENOUS at 07:40

## 2021-05-17 RX ADMIN — PROPOFOL 50 MG: 10 INJECTION, EMULSION INTRAVENOUS at 07:43

## 2021-05-17 RX ADMIN — LIDOCAINE HYDROCHLORIDE 60 MG: 20 INJECTION, SOLUTION INFILTRATION; PERINEURAL at 07:40

## 2021-05-17 ASSESSMENT — MIFFLIN-ST. JEOR: SCORE: 1471

## 2021-05-17 NOTE — OR NURSING
Chest xray reviewed per dr Stover - pt cleared for transfer from PACU.  Meeting discharge criteria from pacu @ 1200.  Hand off to 7B Rn

## 2021-05-17 NOTE — ANESTHESIA POSTPROCEDURE EVALUATION
Patient: Nell Barron    Procedure(s):  Right transaxillary first rib resection    Diagnosis:Venous thoracic outlet syndrome of right subclavian vein [I87.1]  Diagnosis Additional Information: No value filed.    Anesthesia Type:  General    Note:  Disposition: Admission   Postop Pain Control: Uneventful            Sign Out: Well controlled pain   PONV: No   Neuro/Psych: Uneventful            Sign Out: Acceptable/Baseline neuro status   Airway/Respiratory: Uneventful            Sign Out: Acceptable/Baseline resp. status   CV/Hemodynamics: Uneventful            Sign Out: Acceptable CV status; No obvious hypovolemia; No obvious fluid overload   Other NRE: NONE   DID A NON-ROUTINE EVENT OCCUR? No           Last vitals:  Vitals:    05/17/21 0610 05/17/21 1100   BP: 116/86 113/78   Pulse: 80 84   Resp: 18 14   Temp: 36.9  C (98.4  F) 36.4  C (97.5  F)   SpO2: 99% 100%       Last vitals prior to Anesthesia Care Transfer:  CRNA VITALS  5/17/2021 1015 - 5/17/2021 1115      5/17/2021             NIBP:  124/70    Pulse:  99    SpO2:  100 %    EKG:  Sinus rhythm          Electronically Signed By: Willie Lepe MD  May 17, 2021  12:02 PM

## 2021-05-17 NOTE — BRIEF OP NOTE
[facility]    Brief Operative Note    Pre-operative diagnosis: Venous thoracic outlet syndrome of right subclavian vein [I87.1]  Post-operative diagnosis Same as pre-operative diagnosis    Procedure: Procedure(s):  Right transaxillary first rib resection  Surgeon: Surgeon(s) and Role:     * Critsiana Reardon MD - Primary     * Cayla Jefferson MD - Resident - Assisting     * Dinesh Stover MD - Fellow - Assisting  Anesthesia: Combined General with Block   Estimated blood loss:  less than 50 ml  Drains: None  Specimens: Right 1st rib  Findings:   Hypertrophied scalene muscles, narrowed thoracic outlet, mild small pleural tear.  Complications: None.  Implants: * No implants in log *    Admit to obs  APAP and dilaudid PO  Follow post-op XR  RUE venous duplex  Possible discharge later today if pain is controlled.       Dinesh Stover MD, RPVI  Vascular Surgery Fellow  [facility]

## 2021-05-17 NOTE — PLAN OF CARE
Temp: 97.3  F (36.3  C) Temp src: Oral BP: 120/71 Pulse: 88   Resp: 21 SpO2: 97 % O2 Device: Nasal cannula Oxygen Delivery: 1 LPM     Time:   Activity:  SBA, ambulated to bathroom x1   Pain: c/o incisional pain, gave dilaudid x2 reported some relief    Neuro: A&O x4   Cardiac: Reported R sided chest pain, team aware. HR within parameters   Respiratory: C/O SOB, sating upper 90's on 1L O2 NC. Educated the importance of IS.   GI/: Denies nausea. Voiding spontaneously. LBM 5/15, not passing flatus.   Diet: regular diet, tolerating well   Lines: x2 L PIV, 1 SL other TKO    Incisions/Skin: 2 nurse skin check done by Sondra STAFFORD And Alise GONZALEZ knee bruise, RUE bruising, Scar on R side upper back, incision site R axillary liquid bandage no drainage, BERTA.    Plan: Continue to monitor and continue w/ POC

## 2021-05-17 NOTE — ANESTHESIA PROCEDURE NOTES
Erector spinae Procedure Note  Pre-Procedure   Staff -        Anesthesiologist:  Willie Lepe MD       Resident/Fellow: Matt Robert DO       Performed By: resident       Location: OR       Procedure Start/Stop Times: 5/17/2021 10:40 AM and 5/17/2021 10:47 AM       Pre-Anesthestic Checklist: patient identified, IV checked, site marked, risks and benefits discussed, informed consent, monitors and equipment checked, pre-op evaluation, at physician/surgeon's request and post-op pain management  Timeout:       Correct Patient: Yes        Correct Procedure: Yes        Correct Site: Yes        Correct Position: Yes        Correct Laterality: Yes        Site Marked: Yes  Procedure Documentation  Procedure: Erector spinae       Diagnosis: POST OPERATIVE PAIN       Laterality: right       Patient Position: LLD       Patient Prep/Sterile Barriers: sterile gloves, mask       Skin prep: Chloraprep       Insertion site: T3-T4.       Needle Type: short bevel       Needle Gauge: 21.        Needle Length (millimeters): 110        - Ultrasound guided       - Ultrasound used to identify targeted nerve, plexus, vascular marker, or fascial plane and place a needle adjacent to it in real-time       - Ultrasound was used to visualize the spread of anesthetic in close proximity to the above referenced structure       - A permanent image is entered into the patient's record.    Assessment/Narrative         The placement was negative for: blood aspirated, painful injection and site bleeding       Paresthesias: No.     Bolus given via needle..        Secured via.        Insertion/Infusion Method: Single Shot       Complications: none       Injection made incrementally with aspirations every 5 mL.    Comments:  R sided Erector Spinae

## 2021-05-17 NOTE — OR NURSING
Pt arrived to PACU.  PACU Rn assumed care of pt @ 1100  Pt arrived able to state name and deny nausea at present time.  Lungs dim right side - left clear.  Tolerating nasal cannula 3 liters.  incision under right axilla dry & intact - elena.         Xray to be obtained.  --  Additional assessment as per flowsheet.

## 2021-05-17 NOTE — TELEPHONE ENCOUNTER
Left patient a voice mail message and sent her a Simalayat message letting her know that her in person 2 week post op visit needs to be changed to a video visit per Adelita Ferreira. Gave patient my direct extension to call if she has any questions or concerns.

## 2021-05-17 NOTE — PHARMACY-ADMISSION MEDICATION HISTORY
Admission Medication History Completed by Pharmacy    See [facility] Admission Navigator for allergy information, preferred outpatient pharmacy, prior to admission medications and immunization status.     Medication History Sources:     Patient    Changes made to PTA medication list (reason):    Added: None    Deleted: None    Changed:   o Clarified apixaban dose to be 5 mg by mouth twice daily  o Clarified dosing and direction of acetaminophen    Additional Information:    None    Prior to Admission medications    Medication Sig Last Dose Taking? Auth Provider   acetaminophen (TYLENOL) 325 MG tablet Take 650 mg by mouth every 4 hours as needed for pain  PRN Yes Reported, Patient   apixaban ANTICOAGULANT (ELIQUIS) 5 MG tablet Take 5 mg by mouth 2 times daily  5/13/2021 at PM Yes Reported, Patient   multivitamin w/minerals (MULTI-VITAMIN) tablet Take 1 tablet by mouth daily 5/13/2021 Yes Reported, Patient       Date completed: 05/17/21    Medication history completed by: Yessica Rodríguez, KellyD, BCPS  5/17/2021 4:29 PM

## 2021-05-17 NOTE — ANESTHESIA CARE TRANSFER NOTE
Patient: Nell Barron    Procedure(s):  Right transaxillary first rib resection    Diagnosis: Venous thoracic outlet syndrome of right subclavian vein [I87.1]  Diagnosis Additional Information: No value filed.    Anesthesia Type:   General     Note:    Oropharynx: oropharynx clear of all foreign objects and spontaneously breathing  Level of Consciousness: awake  Oxygen Supplementation: nasal cannula    Independent Airway: airway patency satisfactory and stable  Dentition: dentition unchanged  Vital Signs Stable: post-procedure vital signs reviewed and stable  Report to RN Given: handoff report given  Patient transferred to: PACU    Handoff Report: Identifed the Patient, Identified the Reponsible Provider, Reviewed the pertinent medical history, Discussed the surgical course, Reviewed Intra-OP anesthesia mangement and issues during anesthesia, Set expectations for post-procedure period and Allowed opportunity for questions and acknowledgement of understanding      Vitals: (Last set prior to Anesthesia Care Transfer)  CRNA VITALS  5/17/2021 1015 - 5/17/2021 1055      5/17/2021             NIBP:  124/70    Pulse:  99    SpO2:  100 %    EKG:  Sinus rhythm        Electronically Signed By: Matt Robert DO  May 17, 2021  10:55 AM

## 2021-05-17 NOTE — OP NOTE
Date: May 17, 2021     Pre-operative Diagnosis: right  venous Thoracic Outlet Syndrome      Post-operative Diagnosis: Same      Procedure(s): right transaxillary first rib resection with scalenectomy      Surgeon: Cristiana Reardon MD      Assistant: Dinesh Stover MD, Cayla Hernandez MD, and Enedina Menard MS4      Anesthesia: General      Indications: This 31 year old female   developed  right upper extremity DVT and underwent thrombolysis with improvement. She presents now for first rib resection. The risks and benefits were discussed and she was willing to proceed.      Findings: Hypertrophied anterior scalene muscle with fibrous bands.      Complications: None      Estimated Blood Loss:  <50cc      Drains: None      Specimens: None          Procedure Details:   The patient was brought to the operating room, intubated then placed in the left lateral decubitus position. A curvilinear incision was made ~ 2 fingerbreaths inferior to the axillary crease. This was carried down to the chest wall and the first rib identified. The anterior scalene muscle was extremely ossified and firm. There was also a fibrous band across the vein as well as a very ossified and thickened middle scalene as well. The anterior, middle and posterior scalene muscle was then divided off the first rib, as well as the subclavius muscle and all fibrous tissue. The periosteum was elevated off the rib on the anterior and posterior surface then the rib divided at the costochondral junction and laterally beyond the brachial plexus.When hemostasis was achieved, 15 ml of 0.25% Bupivicaine with epinephrine was injected in the incision was then closed in layers with running 3.0 Vicryl and 4.0 monocril followed by skin glue. The patient appeared to have tolerated the procedure well without immediate complication. Sponge, needle and instrument count was reported as correct at the end of the case. The patient awoke neurologically intact with a palpable radial pulse. I was  present throughout the entire portion of the procedure.              Cristiana Reardon MD, DFSVS, RPVI  Director, [facility]  Chief, Vascular and Endovascular Surgery  [facility]  [email or web address]

## 2021-05-17 NOTE — ANESTHESIA PROCEDURE NOTES
Airway       Patient location during procedure: OR  Staff -        Anesthesiologist:  Willie Lepe MD       Resident/Fellow: Matt Robert DO       Performed By: resident  Consent for Airway        Urgency: elective  Indications and Patient Condition       Indications for airway management: neema-procedural       Induction type:intravenous       Mask difficulty assessment: 1 - vent by mask    Final Airway Details       Final airway type: endotracheal airway       Successful airway: ETT - single  Endotracheal Airway Details        ETT size (mm): 7.0       Cuffed: yes       Successful intubation technique: video laryngoscopy       VL Blade Size: MAC 3       Grade View of Cords: 1       Adjucts: stylet       Position: Right       Measured from: lips       Secured at (cm): 21       Bite block used: None    Post intubation assessment        Placement verified by: capnometry, equal breath sounds and chest rise        Number of attempts at approach: 1       Secured with: pink tape       Ease of procedure: easy       Dentition: Intact and Unchanged    Medication(s) Administered   Medication Administration Time: 5/17/2021 7:44 AM    Additional Comments       Intubated by Sudha Melton MD (PGY-1). CMAC used for learning purposes.

## 2021-05-18 ENCOUNTER — PATIENT OUTREACH (OUTPATIENT)
Dept: CARE COORDINATION | Facility: CLINIC | Age: 31
End: 2021-05-18

## 2021-05-18 VITALS
BODY MASS INDEX: 28.64 KG/M2 | OXYGEN SATURATION: 96 % | SYSTOLIC BLOOD PRESSURE: 119 MMHG | RESPIRATION RATE: 18 BRPM | HEIGHT: 64 IN | WEIGHT: 167.77 LBS | DIASTOLIC BLOOD PRESSURE: 74 MMHG | TEMPERATURE: 96.7 F | HEART RATE: 83 BPM

## 2021-05-18 LAB — GLUCOSE BLDC GLUCOMTR-MCNC: 118 MG/DL (ref 70–99)

## 2021-05-18 PROCEDURE — 999N001017 HC STATISTIC GLUCOSE BY METER IP

## 2021-05-18 PROCEDURE — 99024 POSTOP FOLLOW-UP VISIT: CPT | Performed by: NURSE PRACTITIONER

## 2021-05-18 PROCEDURE — 250N000011 HC RX IP 250 OP 636: Performed by: NURSE PRACTITIONER

## 2021-05-18 PROCEDURE — G0378 HOSPITAL OBSERVATION PER HR: HCPCS

## 2021-05-18 PROCEDURE — 250N000013 HC RX MED GY IP 250 OP 250 PS 637: Performed by: STUDENT IN AN ORGANIZED HEALTH CARE EDUCATION/TRAINING PROGRAM

## 2021-05-18 PROCEDURE — 250N000011 HC RX IP 250 OP 636: Performed by: STUDENT IN AN ORGANIZED HEALTH CARE EDUCATION/TRAINING PROGRAM

## 2021-05-18 PROCEDURE — 96376 TX/PRO/DX INJ SAME DRUG ADON: CPT

## 2021-05-18 PROCEDURE — 96375 TX/PRO/DX INJ NEW DRUG ADDON: CPT

## 2021-05-18 PROCEDURE — 250N000011 HC RX IP 250 OP 636

## 2021-05-18 RX ORDER — PROCHLORPERAZINE MALEATE 5 MG
5 TABLET ORAL EVERY 6 HOURS PRN
Qty: 12 TABLET | Refills: 0 | Status: SHIPPED | OUTPATIENT
Start: 2021-05-18 | End: 2021-06-16

## 2021-05-18 RX ORDER — ONDANSETRON 4 MG/1
4 TABLET, ORALLY DISINTEGRATING ORAL EVERY 8 HOURS PRN
Qty: 6 TABLET | Refills: 0 | Status: SHIPPED | OUTPATIENT
Start: 2021-05-18 | End: 2021-06-16

## 2021-05-18 RX ORDER — PROCHLORPERAZINE 25 MG
25 SUPPOSITORY, RECTAL RECTAL EVERY 12 HOURS PRN
Status: DISCONTINUED | OUTPATIENT
Start: 2021-05-18 | End: 2021-05-18 | Stop reason: HOSPADM

## 2021-05-18 RX ORDER — PROCHLORPERAZINE MALEATE 5 MG
5 TABLET ORAL EVERY 6 HOURS PRN
Status: DISCONTINUED | OUTPATIENT
Start: 2021-05-18 | End: 2021-05-18 | Stop reason: HOSPADM

## 2021-05-18 RX ADMIN — PROCHLORPERAZINE EDISYLATE 5 MG: 5 INJECTION INTRAMUSCULAR; INTRAVENOUS at 09:42

## 2021-05-18 RX ADMIN — KETOROLAC TROMETHAMINE 30 MG: 30 INJECTION, SOLUTION INTRAMUSCULAR; INTRAVENOUS at 04:54

## 2021-05-18 RX ADMIN — HYDROMORPHONE HYDROCHLORIDE 4 MG: 2 TABLET ORAL at 04:49

## 2021-05-18 RX ADMIN — ACETAMINOPHEN 650 MG: 325 TABLET, FILM COATED ORAL at 14:20

## 2021-05-18 RX ADMIN — ONDANSETRON 8 MG: 2 INJECTION INTRAMUSCULAR; INTRAVENOUS at 07:54

## 2021-05-18 RX ADMIN — ACETAMINOPHEN 650 MG: 325 TABLET, FILM COATED ORAL at 01:43

## 2021-05-18 RX ADMIN — KETOROLAC TROMETHAMINE 30 MG: 30 INJECTION, SOLUTION INTRAMUSCULAR; INTRAVENOUS at 11:14

## 2021-05-18 RX ADMIN — HYDROMORPHONE HYDROCHLORIDE 4 MG: 2 TABLET ORAL at 08:34

## 2021-05-18 NOTE — DISCHARGE SUMMARY
[facility] Vascular Surgery Service Discharge Summary:     NAME: Nell Barron   MRN: [id number]   : 1990   PCP: Physician No Ref-Primary    DATE OF ADMISSION: 2021       Procedure/Surgery Information   Procedure: Procedure(s):  Right transaxillary first rib resection   Surgeon(s): Surgeon(s) and Role:     * Cristiana Reardon MD - Primary     * Cayla Jefferson MD - Resident - Assisting     * Dinesh Stover MD - Fellow - Assisting   Specimens: * No specimens in log *         PRE/POSTOPERATIVE DIAGNOSES:    Right venous Thoracic Outlet     PROCEDURES PERFORMED, 2021:   right transaxillary first rib resection with scalenectomy    INTRAOPERATIVE FINDINGS: Hypertrophied anterior scalene muscle with fibrous bands.    POSTOPERATIVE COMPLICATIONS: None    DATE OF DISCHARGE: 2021     ADMISSION HPI (from 2021): This 31 year old female developed  right upper extremity DVT and underwent thrombolysis with improvement. She presents now for first rib resection. The risks and benefits were discussed and she was willing to proceed.    HOSPITAL COURSE: Nell Barron is a 31 year old female who was admitted on 2021 and underwent the above-named procedures.  She tolerated the procedure well and postoperatively was transferred to the general post-surgical unit.She was experiencing PONV and spent the night for monitoring.  Her symptoms were relieved with Zofran and Compazine. She was provided a script for both at discharge.  The remainder of her course was essentiallly uncomplicated.  Prior to discharge, her pain was controlled well with dilaudid, tylenol, and ibuprofen.  She was able to perform ADLs and ambulate independently without difficulty, and had full return of bowel and bladder function.  On 2021, she was discharged to home in stable condition.    Vascular Surgery Core Measures:   Resume Eliquis  in the evening    Medications discontinued, held, or adjusted during this hospitalization:  "none    Antibiotics prescribed at discharge and durations: none    Follow up and Imaging Plan: Follow up with vascular surgery JORGE in 2 weeks for an incision check and with Dr. Reardon in 4 weeks.  Begin PT in 2 weeks.    Physical Exam:  Constitutional: healthy, alert, no acute distress and cooperative   Cardiovascular: RRR without  murmurs, rubs, or gallops noted  Respiratory: breathing unlabored without secondary muscle use  Psychiatric: mentation appears normal and affect normal/bright  Neck: no asymmetry  GI/Abdomen: abdomen soft, non-tender. No palpable masses  MSK: able to move all extremities without weakness or ataxia  Extremities: RUE pink and well perfused, strong distal pulses, mild bruising on upper inner arm, right axillary incision CDI with Dermabond in place.          PAST MEDICAL HISTORY:  Past Medical History:   Diagnosis Date     DVT (deep vein thrombosis) in pregnancy     right axillosubclavian     Pseudotumor cerebri      Thoracic outlet syndrome        PAST SURGICAL HISTORY:  Past Surgical History:   Procedure Laterality Date     pseudotumor      cerebri age 16     pseudotumor cerebri  05/17/2021         Labs:  No lab results found.  No lab results found.    Invalid input(s): SODIUM, [id number], BUNCRERATIO, CALCIUM    DISCHARGE INSTRUCTIONS:  Discharge Orders      PHYSICAL THERAPY REFERRAL      Reason for your hospital stay    What to expect after your Thoracic Outlet Surgery.      Hospital Stay:   You were in the hospital to have surgery for 1st rib resection for thoracic outlet syndrome.         You can expect to go home the day after your surgery.    Medications:    Continue to take your medications as directed.    Incision Care:    You will have one incision in the \"arm pit\" area that will be covered with liquid glue, called Dermbond.  This will lift off after the incision heals.  After you get home, you may shower, allowing the warm soapy water to run over it.  Be sure to dry the incision well, " and keep it dry.  AVOID USING LOTIONS OR DEODORANT DIRECTLY OVER THE INCISION.  DO NOT SOAK IN A TUB/POOL ETC. UNTIL THE INCISION IS HEALED.  DO NOT REMOVE THE GLUE UNTIL THE INCISION HEALS.    Activity guidelines:    We encourage you to begin moving your arm within 24 hours to help avoid the development of scar tissue.  You will be given a prescription for physical therapy post-op      Follow Up:    If a follow up appointment was not scheduled prior to your procedure, please call [phone].  If you are not contacted within 3 business days following your procedure to schedule one.  Follow up appointments are scheduled with either your Physician or one of our Advanced Practice Providers.      Risks and Possible Complications:    Infection/Drainage/Bleeding -  Drainage or bleeding from the incision should be minimal.  If you have excessive bleeding or drainage call our office right away.  Low grade fevers are normal (<101F) which is due to not taking deep breaths and avoiding coughing because of pain.    Pain -   You will be given prescription for pain medications as well as anti-inflammatory medications post surgery.  It is important that you take the anti-inflammatory medication until the prescription runs out.  Use of cold packs to the upper chest/neck or a heating pad to the neck may provide additional pain relief and help with swelling.    Numbness/Tingling/Other Thoracic Outlet Syndromes -  You may also have some residual numbness/tingling in the affected arm.  This will likely improve over the next weeks to months.      NOTIFY OUR OFFICE AND SEEK EMERGENT TREATMENT IF YOU DEVELOP:     Have a temperature greater the 101 F   Any redness or purulent drainage from your incision  Sudden onset of shortness of breath    You will be getting a call from the Vascular Surgery Nurse after you are discharged   Vascular Surgery Team Phone Contact Information:       Care Coordinator RN, Vascular  Surgery  [phone]    Vascular Call Center  [phone]    To contact someone after 5 pm, on a weekend, or on a Holiday, please call:  [facility]  [phone], option 4 to have a member of the Vascular Surgeon paged.                                                        Post operative suggested stretches:     Activity    No strenuous activity including no pulling, pushing, or lifting >10lbs until cleared by vascular surgery. Walking (within reasonable limits) is encouraged to prevent muscle deconditioning and blood clots     Wound care and dressings    Your surgical incision sites were closed with sutures under the skin that will dissolve on their own and dermabond (which is somewhat like surgical super glue) to seal the site closed. You can get those incisions wet, but avoid soaking/submerging them for the next 3 weeks to allow proper healing. You should also avoid rubbing or abrading the glue. It will slowly dissolve or fall off on its own. If at the incision site you start having new/different discharge/drainage, foul smell, or redness and warmth to the touch, you should call the vascular surgery office. Also call the office if you have a fever of 100.5F or greater     [facility]ult [facility] Follow-up and recommended labs and tests    For vascular surgery follow-up care, you should see one of the vascular surgery advanced practice providers (JOR[facility]) in the outpatient clinic on 6/7/21    You should see Dr. Reardon in the outpatient clinic on 6/16/21 for routine follow-up to check your progress. Venous duplex tests should be done prior to seeing the doctor.       With general vascular surgery questions, concerns, or to request/change an appointment, please call either:    Milagro De La Garza, Care Coordinator RN, Vascular Surgery  [phone]  Or   Vascular Call Center  [phone]    To contact someone after 5 pm, on a weekend, or on a Holiday, please call:  [facility]  "[place]  [phone], option 4 to have the Vascular Surgeon on call paged     Brief Discharge Instructions    Resume your Eliquis tonight     Diet    Follow this diet upon discharge: Regular     Discharge Medications   Current Discharge Medication List      START taking these medications    Details   HYDROmorphone (DILAUDID) 2 MG tablet Take 1 tablet (2 mg) by mouth every 6 hours as needed for pain  Qty: 10 tablet, Refills: 0    Associated Diagnoses: Venous thoracic outlet syndrome of right subclavian vein      ibuprofen (ADVIL/MOTRIN) 600 MG tablet Take 1 tablet (600 mg) by mouth every 6 hours  Qty: 90 tablet, Refills: 1    Associated Diagnoses: Venous thoracic outlet syndrome of right subclavian vein      ondansetron (ZOFRAN-ODT) 4 MG ODT tab Take 1 tablet (4 mg) by mouth every 8 hours as needed for nausea  Qty: 6 tablet, Refills: 0    Associated Diagnoses: Venous thoracic outlet syndrome of right subclavian vein      prochlorperazine (COMPAZINE) 5 MG tablet Take 1 tablet (5 mg) by mouth every 6 hours as needed for nausea or vomiting  Qty: 12 tablet, Refills: 0    Associated Diagnoses: Venous thoracic outlet syndrome of right subclavian vein; PONV (postoperative nausea and vomiting)      senna-docusate (SENOKOT-S/PERICOLACE) 8.6-50 MG tablet Take 1 tablet by mouth 2 times daily as needed for constipation  Qty: 30 tablet, Refills: 0    Associated Diagnoses: Venous thoracic outlet syndrome of right subclavian vein         CONTINUE these medications which have NOT CHANGED    Details   acetaminophen (TYLENOL) 325 MG tablet Take 650 mg by mouth every 4 hours as needed for pain       apixaban ANTICOAGULANT (ELIQUIS) 5 MG tablet Take 5 mg by mouth 2 times daily       multivitamin w/minerals (MULTI-VITAMIN) tablet Take 1 tablet by mouth daily           Allergies   Allergies   Allergen Reactions     Minocycline Other (See Comments)     Happened as a child, cerebral spinal fluid \"build-up\"            Adelita Ferreira DNP, " ELOY RAMIREZ  Division of Vascular Surgery   [facility]  Pager: [phone]

## 2021-05-18 NOTE — PLAN OF CARE
Vital signs:  Temp: 98.3  F (36.8  C) Temp src: Oral BP: 115/65 Pulse: 93   Resp: 18 SpO2: 97 % O2 Device: None (Room air)     Time: 1930-0730  Status: POD #1 s/p R transaxillary first rib resection  Neuros: A&Ox4. Neuros intact.   Cardiac: WDL, denies chest pain.   Respiratory: LS clear, diminished, sating well on RA, IS encouraged.   Pain: managed with scheduled Toradol, Tylenol and prn Dilaudid with relief.   Nausea: c/o abdominal discomfort and nausea, emesis x1, prn Zofran x1 with relief.   Diet: Regular diet.   GI/: Voiding adequate amount. Abdomen soft, faint BS, denies passing flatus, no BM.   Skin/incisions: R axillary incision BERTA wooten.   Lines: L PIV SL.   Drains: None  Labs: Reviewed.   Activity: Up ambulated to BR with SBA.   New Changes this Shift: US completed. Pt c/o abdominal discomfort and nausea, Gen. Surgery paged, new order placed for prn Zofran.   Plan: possible discharge today. Continue POC.

## 2021-05-19 NOTE — PROGRESS NOTES
[facility]: Post-Discharge Note  SITUATION                                                      Admission:    Admission Date: 05/17/21   Reason for Admission: Venous thoracic outlet syndrome of right subclavian vein  Discharge:   Discharge Date: 05/18/21  Discharge Diagnosis: Venous thoracic outlet syndrome of right subclavian vein    BACKGROUND                                                    This 31 year old female developed  right upper extremity DVT and underwent thrombolysis with improvement. She presents now for first rib resection. The risks and benefits were discussed and she was willing to proceed    ASSESSMENT      Discharge Assessment  Patient reports symptoms are: Improved  Does the patient have all of their medications?: Yes  Does patient know what their new medications are for?: Yes  Does patient have a follow-up appointment scheduled?: Yes  Does patient have any other questions or concerns?: No    Post-op  Did the patient have surgery or a procedure: Yes  Fever: No  Chills: No  Eating & Drinking: eating and drinking without complaints/concerns  Bowel Function: normal  Urinary Status: voiding without complaint/concerns        PLAN                                                      Outpatient Plan: If a follow up appointment was not scheduled prior to your procedure, please call [phone].  If you are not contacted within 3 business days following your procedure to schedule one.  Follow up appointments are scheduled with either your Physician or one of our Advanced Practice Providers.       Future Appointments   Date Time Provider Department Center   6/7/2021  2:30 PM Adelita Ferreira NP [facility]   6/14/2021  8:30 AM [email or web address]   6/14/2021  9:00 AM [facility]   6/16/2021  8:00 AM Cristiana Reardon MD [facility]           Farzaneh Forrester

## 2021-05-20 DIAGNOSIS — I87.1 VENOUS THORACIC OUTLET SYNDROME OF RIGHT SUBCLAVIAN VEIN: Primary | ICD-10-CM

## 2021-05-20 DIAGNOSIS — Z98.890 H/O RESECTION OF RIB: ICD-10-CM

## 2021-05-20 RX ORDER — SCOLOPAMINE TRANSDERMAL SYSTEM 1 MG/1
1 PATCH, EXTENDED RELEASE TRANSDERMAL
Qty: 4 PATCH | Refills: 1 | Status: SHIPPED | OUTPATIENT
Start: 2021-05-20 | End: 2021-06-16

## 2021-05-20 RX ORDER — HYDROMORPHONE HYDROCHLORIDE 2 MG/1
2 TABLET ORAL EVERY 6 HOURS PRN
Qty: 30 TABLET | Refills: 0 | Status: SHIPPED | OUTPATIENT
Start: 2021-05-20

## 2021-06-01 ENCOUNTER — TRANSFERRED RECORDS (OUTPATIENT)
Dept: HEALTH INFORMATION MANAGEMENT | Facility: CLINIC | Age: 31
End: 2021-06-01

## 2021-06-01 DIAGNOSIS — I87.1 VENOUS THORACIC OUTLET SYNDROME OF RIGHT SUBCLAVIAN VEIN: Primary | ICD-10-CM

## 2021-06-05 ENCOUNTER — HEALTH MAINTENANCE LETTER (OUTPATIENT)
Age: 31
End: 2021-06-05

## 2021-06-06 NOTE — PLAN OF CARE
Assessment:      Acute bilateral Otitis media     Plan:      Analgesics discussed.  Antibiotic per orders.  RTC if symptoms worsening or not improving in 3 days.  Ear recheck in 3 weeks.     Subjective:       History was provided by the mother.  Oumar Holley is a 8 m.o. male who presents with possible ear infection. Symptoms include bilateral ear pain, congestion, cough, fever, irritability and tugging at both ears. Symptoms began 2 days ago and there has been no improvement since that time. Patient denies dyspnea. History of previous ear infections: once before a couple of months ago    The following portions of the patient's history were reviewed and updated as appropriate: allergies, current medications, past family history, past medical history, past social history, past surgical history and problem list.    Review of Systems  Pertinent items are noted in HPI     Objective:      Pulse 150   Temp 98.9  F (37.2  C) Comment: tylenol 10am  Resp 29   Wt 19 lb 11 oz (8.93 kg)   SpO2 98%     General: alert, appears stated age and cooperative without apparent respiratory distress.   HEENT:  right and left TM red, dull, bulging, neck has no anterior cervical nodes enlarged and pharynx erythematous without exudate   Neck: no adenopathy   Lungs: clear to auscultation bilaterally       Alert and oriented. Up to BR independently. Nauseated at beginning of shift. Zofran 8mg given IV before patient ate breakfast. Had emesis shortly after Zofran given.  Ate breakfast and then had another emesis. Compazine 5mg IV given with good relief of nausea and patient slept. Ate small amount for lunch. Dilaudid 4mg oral tablets given at beginning of shift and no further pain medication wanted. Plans to take when she gets home. Discharge instructions and medications reviewed and copy given to patient. Rx filled here. Brought to pharmacy and front door by transport.

## 2021-06-07 ENCOUNTER — VIRTUAL VISIT (OUTPATIENT)
Dept: VASCULAR SURGERY | Facility: CLINIC | Age: 31
End: 2021-06-07
Payer: COMMERCIAL

## 2021-06-07 DIAGNOSIS — I87.1 VENOUS THORACIC OUTLET SYNDROME OF RIGHT SUBCLAVIAN VEIN: Primary | ICD-10-CM

## 2021-06-07 PROCEDURE — 99024 POSTOP FOLLOW-UP VISIT: CPT | Performed by: NURSE PRACTITIONER

## 2021-06-07 ASSESSMENT — PAIN SCALES - GENERAL: PAINLEVEL: NO PAIN (0)

## 2021-06-07 NOTE — LETTER
6/7/2021       RE: Nell Barron  [address] [facility] [address]     Dear Colleague,    Thank you for referring your patient, Nell Barron, to the [facility]ULAR [facility] [place] at [facility]. Please see a copy of my visit note below.    Vascular Surgery Postoperative Follow up:    Nell is a 31 year old who is being evaluated via a billable video visit.      How would you like to obtain your AVS? MyChart  If the video visit is dropped, the invitation should be resent by: Other e-mail: [employer]\  Will anyone else be joining your video visit? No      Video Start Time: 14:31    Assessment & Plan   Problem List Items Addressed This Visit     Venous thoracic outlet syndrome of right subclavian vein - Primary           31 year old female with PMH significant for right TOS, now s/p right first rib resection on 5/17/2021 with Dr. Reardon.  Doing well post-operatively.    -Continue PT, OK to increase activity as tolerated  -Continue Eliquis  -Follow up with Dr. Reardon next week with ultrasound prior to the visit as previously scheduled.    Adelita Ferreira NP  [facility] [place]    Subjective   Nell is a 31 year old who presents for the following health issues:    HPI     Nell Barron is a 31 year old female with PMH significant for venous thoracic outlet syndrome who developed a RUE DVT and underwent lysis with improvement.  She is now s/p Right first rib resection on 5/17 with Dr. Reardon and presents today for routine post-op follow up.    She reports that overall she is doing well.  The first week after surgery was difficult and she struggled with nausea and constipation that was eventually relieved with miralax.  Her nausea was mildly relieved with anti-emetics, and has now resolved since stopping narcotics and resolving constipation.  She began PT last week and overall feels her ROM is good.  She denies weakness or numbness in her hands or  fingers.  She plans to return to work next week.      Review of Systems   Constitutional, HEENT, cardiovascular, pulmonary, gi and gu systems are negative, except as otherwise noted.      Objective    Vitals - Patient Reported  Pain Score: No Pain (0)    Physical Exam   GENERAL: Healthy, alert and no distress  EYES: Eyes grossly normal to inspection.  No discharge or erythema, or obvious scleral/conjunctival abnormalities.  RESP: No audible wheeze, cough, or visible cyanosis.  No visible retractions or increased work of breathing.    SKIN: Visible skin clear. No significant rash, abnormal pigmentation or lesions.  NEURO: Cranial nerves grossly intact.  Mentation and speech appropriate for age.  PSYCH: Mentation appears normal, affect normal/bright, judgement and insight intact, normal speech and appearance well-groomed.  EXTREMITIES:  Right axillary incision well approximated without surrounding redness, swelling, or drainage.    Video-Visit Details    Type of service:  Video Visit    Video End Time:14:50    Originating Location (pt. Location): Home    Distant Location (provider location):  [facility] VASCULAR [facility] [place]     Platform used for Video Visit: [employer]    Again, thank you for allowing me to participate in the care of your patient.      Sincerely,    Adelita Ferreira NP

## 2021-06-07 NOTE — PATIENT INSTRUCTIONS
-Continue PT, OK to increase activity as tolerated  -Continue Eliquis  -Follow up with Dr. Reardon next week with ultrasound prior to the visit as previously scheduled.      With questions, concerns, or to request an appointment, please call either:    Blessing Canseco, Care Coordinator RN, Vascular Surgery  [phone]    Vascular Call Center  [phone]    To contact someone after 5 pm, on a weekend, or on a Holiday, please call:  [facility]  [phone], option 4 to have the attending physician for Vascular Surgery Service paged.

## 2021-06-07 NOTE — NURSING NOTE
Vascular Rooming Note     Nell Barron's goals for this visit include:   Chief Complaint   Patient presents with     RECHECK     Nell, is participating in a visit today for a follow up right TOS, feeling good, stillexperiencing some soreness and incision is still open, as reported by patient.     Aliyah Martin LPN

## 2021-06-07 NOTE — PROGRESS NOTES
Vascular Surgery Postoperative Follow up:    Nell is a 31 year old who is being evaluated via a billable video visit.      How would you like to obtain your AVS? MyChart  If the video visit is dropped, the invitation should be resent by: Other e-mail: [email or web address]\  Will anyone else be joining your video visit? No      Video Start Time: 14:31    Assessment & Plan   Problem List Items Addressed This Visit     Venous thoracic outlet syndrome of right subclavian vein - Primary           31 year old female with PMH significant for right TOS, now s/p right first rib resection on 5/17/2021 with Dr. Reardon.  Doing well post-operatively.    -Continue PT, OK to increase activity as tolerated  -Continue Eliquis  -Follow up with Dr. Reardon next week with ultrasound prior to the visit as previously scheduled.    Adelita Ferreira NP  [facility] VASCULAR [facility] BET[place] Camejo is a 31 year old who presents for the following health issues:    HPI     Nell Barron is a 31 year old female with PMH significant for venous thoracic outlet syndrome who developed a RUE DVT and underwent lysis with improvement.  She is now s/p Right first rib resection on 5/17 with Dr. Reardon and presents today for routine post-op follow up.    She reports that overall she is doing well.  The first week after surgery was difficult and she struggled with nausea and constipation that was eventually relieved with miralax.  Her nausea was mildly relieved with anti-emetics, and has now resolved since stopping narcotics and resolving constipation.  She began PT last week and overall feels her ROM is good.  She denies weakness or numbness in her hands or fingers.  She plans to return to work next week.      Review of Systems   Constitutional, HEENT, cardiovascular, pulmonary, gi and gu systems are negative, except as otherwise noted.      Objective    Vitals - Patient Reported  Pain Score: No Pain (0)        Physical Exam   GENERAL: Healthy, alert and no  distress  EYES: Eyes grossly normal to inspection.  No discharge or erythema, or obvious scleral/conjunctival abnormalities.  RESP: No audible wheeze, cough, or visible cyanosis.  No visible retractions or increased work of breathing.    SKIN: Visible skin clear. No significant rash, abnormal pigmentation or lesions.  NEURO: Cranial nerves grossly intact.  Mentation and speech appropriate for age.  PSYCH: Mentation appears normal, affect normal/bright, judgement and insight intact, normal speech and appearance well-groomed.  EXTREMITIES:  Right axillary incision well approximated without surrounding redness, swelling, or drainage.    Video-Visit Details    Type of service:  Video Visit    Video End Time:14:50    Originating Location (pt. Location): Home    Distant Location (provider location):  [facility]ASCULAR [facility] [place]     Platform used for Video Visit: [employer]

## 2021-06-14 ENCOUNTER — ANCILLARY PROCEDURE (OUTPATIENT)
Dept: ULTRASOUND IMAGING | Facility: CLINIC | Age: 31
End: 2021-06-14
Attending: SURGERY
Payer: COMMERCIAL

## 2021-06-14 DIAGNOSIS — G54.0 THORACIC OUTLET SYNDROME: ICD-10-CM

## 2021-06-14 DIAGNOSIS — I82.890: ICD-10-CM

## 2021-06-14 DIAGNOSIS — I87.1 VENOUS THORACIC OUTLET SYNDROME OF RIGHT SUBCLAVIAN VEIN: ICD-10-CM

## 2021-06-14 LAB — RADIOLOGIST FLAGS: ABNORMAL

## 2021-06-14 PROCEDURE — 93931 UPPER EXTREMITY STUDY: CPT | Mod: LT | Performed by: RADIOLOGY

## 2021-06-14 PROCEDURE — 93971 EXTREMITY STUDY: CPT | Mod: RT | Performed by: RADIOLOGY

## 2021-06-14 PROCEDURE — 93971 EXTREMITY STUDY: CPT | Mod: LT | Performed by: RADIOLOGY

## 2021-06-16 ENCOUNTER — VIRTUAL VISIT (OUTPATIENT)
Dept: VASCULAR SURGERY | Facility: CLINIC | Age: 31
End: 2021-06-16
Payer: COMMERCIAL

## 2021-06-16 DIAGNOSIS — I87.1 VENOUS THORACIC OUTLET SYNDROME OF RIGHT SUBCLAVIAN VEIN: Primary | ICD-10-CM

## 2021-06-16 DIAGNOSIS — Z98.890 H/O RESECTION OF RIB: ICD-10-CM

## 2021-06-16 PROCEDURE — 99207 PR SATISFY VISIT NUMBER: CPT | Performed by: SURGERY

## 2021-06-16 NOTE — LETTER
6/16/2021       RE: Nell Barron  [address] [facility] [address]     Dear Colleague,    Thank you for referring your patient, Nell Barron, to the [facility]ASCULAR [facility]IN[facility] [place] at [facility]. Please see a copy of my visit note below.    Vascular Surgery Clinic Post-Procedure Follow Up Visit    June 16, 2021    Nell Barron  [id number]      HPI: Patient follows up today s/p right transaxillary first rib resection for venous thoracic outlet syndrome and effort thrombosis. Feeling well with still some supraclavicular neck and chest pain, which is not unexpected after the rib resection.  She has been back to doing her cardiovascular workouts and slowly beginning some weight training with her physical therapist help.  She denies issues with bleeding.  She does note some discoloration intermittently in her right hand though not significant swelling.  She may get an occasional twinge in her left arm but does not appreciate swelling or any color changes.  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      Please see Epic rooming record from today documenting vital signs and current medications.    On exam, pleasant 31 year old in NAD.    Her recent noninvasive vascular lab studies show chronic deep vein thrombosis in a short segment of the proximal to mid right subclavian vein.  Thoracic outlet studies done on the left side show arterial waveform flattening at 180 degrees abduction and venous compression beginning at 90 degrees and up.      AP: Doing well s/p right transaxillary first rib resection.  We discussed her obtaining a hypercoagulable work-up.  I would like her to get an ultrasound to follow-up her right subclavian chronic DVT in 1 month.  If this is stable to improved we will plan to discontinue her Eliquis and have her begin an aspirin 81 mg daily.  I did discuss the options of an outpatient venoplasty of this area if she finds the  arm is not progressing as she would like, however I would like to give her some more time to see if she will continue to improve on her own.  We will arrange the 1 month ultrasound follow-up as well as the hypercoagulable lab work-up.      Many thanks for involving me in the care of this very pleasant patient. Should any questions or concerns arise, please don't hesitate to contact me.    Warm Regards,    Cristiana Reardon MD, DFSVS, RPVI  Director, [facility]  Professor and Chief, Vascular and Endovascular Surgery  [facility]  [email or web address]        Again, thank you for allowing me to participate in the care of your patient.      Sincerely,    Cristiana Reardon MD

## 2021-06-16 NOTE — PROGRESS NOTES
Vascular Surgery Clinic Post-Procedure Follow Up Visit    June 16, 2021    Nell Barron  [id number]      HPI: Patient follows up today s/p right transaxillary first rib resection for venous thoracic outlet syndrome and effort thrombosis. Feeling well with still some supraclavicular neck and chest pain, which is not unexpected after the rib resection.  She has been back to doing her cardiovascular workouts and slowly beginning some weight training with her physical therapist help.  She denies issues with bleeding.  She does note some discoloration intermittently in her right hand though not significant swelling.  She may get an occasional twinge in her left arm but does not appreciate swelling or any color changes.  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      Please see Epic rooming record from today documenting vital signs and current medications.    On exam, cruz 31 year old in NAD.    Her recent noninvasive vascular lab studies show chronic deep vein thrombosis in a short segment of the proximal to mid right subclavian vein.  Thoracic outlet studies done on the left side show arterial waveform flattening at 180 degrees abduction and venous compression beginning at 90 degrees and up.      AP: Doing well s/p right transaxillary first rib resection.  We discussed her obtaining a hypercoagulable work-up.  I would like her to get an ultrasound to follow-up her right subclavian chronic DVT in 1 month.  If this is stable to improved we will plan to discontinue her Eliquis and have her begin an aspirin 81 mg daily.  I did discuss the options of an outpatient venoplasty of this area if she finds the arm is not progressing as she would like, however I would like to give her some more time to see if she will continue to improve on her own.  We will arrange the 1 month ultrasound follow-up as well as the hypercoagulable lab work-up.      Many thanks for involving me in the care of this very cruz  patient. Should any questions or concerns arise, please don't hesitate to contact me.    Warm Regards,    Cristiana Reardon MD, DFSVS, RPVI  Director, [facility]  Professor and Chief, Vascular and Endovascular Surgery  [facility]  [email or web address]

## 2021-06-17 DIAGNOSIS — I82.890: ICD-10-CM

## 2021-06-17 DIAGNOSIS — I87.1 VENOUS THORACIC OUTLET SYNDROME OF RIGHT SUBCLAVIAN VEIN: Primary | ICD-10-CM

## 2021-06-17 DIAGNOSIS — I82.409 DVT (DEEP VENOUS THROMBOSIS) (H): ICD-10-CM

## 2021-07-14 ENCOUNTER — ANCILLARY PROCEDURE (OUTPATIENT)
Dept: ULTRASOUND IMAGING | Facility: CLINIC | Age: 31
End: 2021-07-14
Attending: SURGERY
Payer: COMMERCIAL

## 2021-07-14 ENCOUNTER — LAB (OUTPATIENT)
Dept: LAB | Facility: CLINIC | Age: 31
End: 2021-07-14
Payer: COMMERCIAL

## 2021-07-14 DIAGNOSIS — I82.890: ICD-10-CM

## 2021-07-14 DIAGNOSIS — I82.409 DVT (DEEP VENOUS THROMBOSIS) (H): ICD-10-CM

## 2021-07-14 DIAGNOSIS — I87.1 VENOUS THORACIC OUTLET SYNDROME OF RIGHT SUBCLAVIAN VEIN: ICD-10-CM

## 2021-07-14 PROCEDURE — 81240 F2 GENE: CPT | Mod: 90 | Performed by: PATHOLOGY

## 2021-07-14 PROCEDURE — 83090 ASSAY OF HOMOCYSTEINE: CPT | Mod: 90 | Performed by: PATHOLOGY

## 2021-07-14 PROCEDURE — 36415 COLL VENOUS BLD VENIPUNCTURE: CPT | Performed by: PATHOLOGY

## 2021-07-14 PROCEDURE — 99000 SPECIMEN HANDLING OFFICE-LAB: CPT | Performed by: PATHOLOGY

## 2021-07-14 PROCEDURE — 93971 EXTREMITY STUDY: CPT | Mod: RT | Performed by: RADIOLOGY

## 2021-07-14 PROCEDURE — G0452 MOLECULAR PATHOLOGY INTERPR: HCPCS | Mod: 26 | Performed by: PATHOLOGY

## 2021-07-14 PROCEDURE — 81241 F5 GENE: CPT | Mod: 90 | Performed by: PATHOLOGY

## 2021-07-14 PROCEDURE — 85303 CLOT INHIBIT PROT C ACTIVITY: CPT | Mod: 90 | Performed by: PATHOLOGY

## 2021-07-14 PROCEDURE — 85306 CLOT INHIBIT PROT S FREE: CPT | Mod: 90 | Performed by: PATHOLOGY

## 2021-07-19 LAB
PROT C ACT/NOR PPP CHRO: 95 % (ref 70–170)
PROT S FREE AG ACT/NOR PPP IA: 117 % (ref 55–125)

## 2021-07-21 LAB — HCYS SERPL-SCNC: 6.4 UMOL/L (ref 4–12)

## 2021-07-23 LAB
FACTOR 2 INTERPRETATION: NORMAL
FACTOR V INTERPRETATION: NORMAL
LAB DIRECTOR COMMENTS: NORMAL
LAB DIRECTOR COMMENTS: NORMAL
LAB DIRECTOR DISCLAIMER: NORMAL
LAB DIRECTOR DISCLAIMER: NORMAL
LAB DIRECTOR INTERPRETATION: NORMAL
LAB DIRECTOR INTERPRETATION: NORMAL
LAB DIRECTOR METHODOLOGY: NORMAL
LAB DIRECTOR METHODOLOGY: NORMAL
LAB DIRECTOR RESULTS: NORMAL
LAB DIRECTOR RESULTS: NORMAL
MDL NUMBER: NORMAL
MDL NUMBER: NORMAL

## 2021-09-25 ENCOUNTER — HEALTH MAINTENANCE LETTER (OUTPATIENT)
Age: 31
End: 2021-09-25

## 2022-07-02 ENCOUNTER — HEALTH MAINTENANCE LETTER (OUTPATIENT)
Age: 32
End: 2022-07-02

## 2022-12-26 ENCOUNTER — HEALTH MAINTENANCE LETTER (OUTPATIENT)
Age: 32
End: 2022-12-26

## 2023-07-09 ENCOUNTER — HEALTH MAINTENANCE LETTER (OUTPATIENT)
Age: 33
End: 2023-07-09

## (undated) DEVICE — SUCTION SLEEVE NEPTUNE 2 165MM 0703-005-165

## (undated) DEVICE — ESU ELEC BLADE 2.75" COATED/INSULATED E1455

## (undated) DEVICE — LINEN TOWEL PACK X30 5481

## (undated) DEVICE — ESU PENCIL SMOKE EVAC W/ROCKER SWITCH 0703-047-000

## (undated) DEVICE — SPONGE SURGIFOAM 100 1974

## (undated) DEVICE — SPONGE KITTNER 30-101

## (undated) DEVICE — NDL 21GA 1.5"

## (undated) DEVICE — DRAPE SHEET MED 44X70" 9355

## (undated) DEVICE — DRAIN CHEST TUBE 12FR STR 8012

## (undated) DEVICE — SUCTION SLEEVE NEPTUNE 2 125MM 0703-005-125

## (undated) DEVICE — DRAPE STOCKINETTE IMPERVIOUS 10" 21048

## (undated) DEVICE — Device

## (undated) DEVICE — SYR 20ML LL W/O NDL 302830

## (undated) DEVICE — CLIP HORIZON SM RED WIDE SLOT 001201

## (undated) DEVICE — ESU ELEC BLADE 6" COATED/INSULATED E1455-6

## (undated) DEVICE — PREP CHLORAPREP 26ML TINTED ORANGE  260815

## (undated) DEVICE — ADH SKIN CLOSURE PREMIERPRO EXOFIN 1.0ML 3470

## (undated) DEVICE — SUCTION MANIFOLD NEPTUNE 2 SYS 4 PORT 0702-020-000

## (undated) DEVICE — GLOVE PROTEXIS BLUE W/NEU-THERA 6.5  2D73EB65

## (undated) DEVICE — DRAPE IOBAN INCISE 23X17" 6650EZ

## (undated) DEVICE — ESU GROUND PAD ADULT W/CORD E7507

## (undated) DEVICE — SU SILK 3-0 TIE 12X30" A304H

## (undated) DEVICE — GLOVE PROTEXIS W/NEU-THERA 6.5  2D73TE65

## (undated) DEVICE — LINEN TOWEL PACK X6 WHITE 5487

## (undated) DEVICE — CLIP HORIZON MED BLUE 002200

## (undated) DEVICE — NDL BLUNT 18GA 1" W/O FILTER 305181

## (undated) DEVICE — SYR 10ML INTERLINK LL W/VIAL ACCESS CAN 303405

## (undated) DEVICE — SU VICRYL 3-0 SH 27" UND J416H

## (undated) DEVICE — SU MONOCRYL 4-0 PS-2 18" UND Y496G

## (undated) DEVICE — SURGICEL HEMOSTAT 4X8" 1952

## (undated) DEVICE — SOL NACL 0.9% IRRIG 1000ML BOTTLE 2F7124

## (undated) RX ORDER — ONDANSETRON 2 MG/ML
INJECTION INTRAMUSCULAR; INTRAVENOUS
Status: DISPENSED
Start: 2021-05-17

## (undated) RX ORDER — EPHEDRINE SULFATE 50 MG/ML
INJECTION, SOLUTION INTRAMUSCULAR; INTRAVENOUS; SUBCUTANEOUS
Status: DISPENSED
Start: 2021-05-17

## (undated) RX ORDER — HEPARIN SODIUM 1000 [USP'U]/ML
INJECTION, SOLUTION INTRAVENOUS; SUBCUTANEOUS
Status: DISPENSED
Start: 2021-05-17

## (undated) RX ORDER — BUPIVACAINE HYDROCHLORIDE AND EPINEPHRINE 2.5; 5 MG/ML; UG/ML
INJECTION, SOLUTION INFILTRATION; PERINEURAL
Status: DISPENSED
Start: 2021-05-17

## (undated) RX ORDER — ACETAMINOPHEN 325 MG/1
TABLET ORAL
Status: DISPENSED
Start: 2021-05-17

## (undated) RX ORDER — FENTANYL CITRATE 50 UG/ML
INJECTION, SOLUTION INTRAMUSCULAR; INTRAVENOUS
Status: DISPENSED
Start: 2021-05-17

## (undated) RX ORDER — DEXAMETHASONE SODIUM PHOSPHATE 4 MG/ML
INJECTION, SOLUTION INTRA-ARTICULAR; INTRALESIONAL; INTRAMUSCULAR; INTRAVENOUS; SOFT TISSUE
Status: DISPENSED
Start: 2021-05-17

## (undated) RX ORDER — LIDOCAINE HYDROCHLORIDE 20 MG/ML
INJECTION, SOLUTION EPIDURAL; INFILTRATION; INTRACAUDAL; PERINEURAL
Status: DISPENSED
Start: 2021-05-17

## (undated) RX ORDER — CEFAZOLIN SODIUM 2 G/100ML
INJECTION, SOLUTION INTRAVENOUS
Status: DISPENSED
Start: 2021-05-17

## (undated) RX ORDER — FENTANYL CITRATE-0.9 % NACL/PF 10 MCG/ML
PLASTIC BAG, INJECTION (ML) INTRAVENOUS
Status: DISPENSED
Start: 2021-05-17

## (undated) RX ORDER — HYDROMORPHONE HYDROCHLORIDE 1 MG/ML
INJECTION, SOLUTION INTRAMUSCULAR; INTRAVENOUS; SUBCUTANEOUS
Status: DISPENSED
Start: 2021-05-17

## (undated) RX ORDER — KETOROLAC TROMETHAMINE 30 MG/ML
INJECTION, SOLUTION INTRAMUSCULAR; INTRAVENOUS
Status: DISPENSED
Start: 2021-05-17

## (undated) RX ORDER — PROPOFOL 10 MG/ML
INJECTION, EMULSION INTRAVENOUS
Status: DISPENSED
Start: 2021-05-17